# Patient Record
Sex: MALE | Race: WHITE | Employment: FULL TIME | URBAN - NONMETROPOLITAN AREA
[De-identification: names, ages, dates, MRNs, and addresses within clinical notes are randomized per-mention and may not be internally consistent; named-entity substitution may affect disease eponyms.]

---

## 2023-01-24 ENCOUNTER — HOSPITAL ENCOUNTER (EMERGENCY)
Age: 63
Discharge: HOME | End: 2023-01-24
Attending: EMERGENCY MEDICINE
Payer: COMMERCIAL

## 2023-01-24 VITALS
DIASTOLIC BLOOD PRESSURE: 78 MMHG | HEART RATE: 98 BPM | OXYGEN SATURATION: 94 % | BODY MASS INDEX: 30.48 KG/M2 | HEIGHT: 73 IN | TEMPERATURE: 98.2 F | SYSTOLIC BLOOD PRESSURE: 136 MMHG | WEIGHT: 230 LBS | RESPIRATION RATE: 18 BRPM

## 2023-01-24 DIAGNOSIS — T83.010A SUPRAPUBIC CATHETER DYSFUNCTION, INITIAL ENCOUNTER (HCC): Primary | ICD-10-CM

## 2023-01-24 PROCEDURE — 99283 EMERGENCY DEPT VISIT LOW MDM: CPT

## 2023-01-24 PROCEDURE — 51702 INSERT TEMP BLADDER CATH: CPT

## 2023-01-24 RX ORDER — AMLODIPINE BESYLATE 10 MG/1
10 TABLET ORAL DAILY
COMMUNITY

## 2023-01-24 RX ORDER — HYDROCHLOROTHIAZIDE 25 MG/1
25 TABLET ORAL DAILY
COMMUNITY

## 2023-01-24 ASSESSMENT — ENCOUNTER SYMPTOMS
ABDOMINAL PAIN: 0
RESPIRATORY NEGATIVE: 1

## 2023-01-24 NOTE — DISCHARGE INSTRUCTIONS
Continue your current catheter care.  Keep your follow-up with your clinician.  Watch for any developing symptoms such as increasing pain or fever.  Continue your antibiotics.

## 2023-01-24 NOTE — ED NOTES
Patient had 16F/10cc montiel that became dislodged this morning. Nurse attempted to place another 16F/10cc S/P catheter with difficulty.  [facility] notified and immediately at patients bedside, attempting to place 16F/10cc catheter with difficulty, verbal order received for nurse to place a 14F/10cc S/P catheter. 14F/10cc catheter placed without difficulty. Patient reports that he is currently on antibiotics for a UTI, nurse reviewed the importance of completing full course.       Odilon Lui RN  01/24/23 1205

## 2023-01-24 NOTE — ED PROVIDER NOTES
[facility] EMERGENCY DEPT  eMERGENCY dEPARTMENT eNCOUnter      Pt Name: Fidencio Bonilla  MRN: [id number]  [place] 1960  Date of evaluation: 1/24/2023  Provider: Surekha Oseguera MD    [address] [place] [address]       Chief Complaint   Patient presents with    Urinary Catheter     Patient has suprapubic catheter that became dislodged this morning         HISTORY OF PRESENT ILLNESS   (Location/Symptom, Timing/Onset,Context/Setting, Quality, Duration, Modifying Factors, Severity)  Note limiting factors. Fidencio Bonilla is a 63 y.o. male who presents to the emergency department on suprapubic catheter    82-year-old male with a suprapubic catheter. Presents with morning after the catheter came out around 6 6:30 AM.  He has had the suprapubic catheter for over 2 years. He had developed prostate cancer and complications. And required suprapubic catheter chronically. It is usually replaced every 3 weeks. By his doctor or nurse. It was replaced this past Saturday. He states he has a urinary tract infection is currently on antibiotics. Patient had some lower abdominal discomfort but then he had an expulsion of urine through the suprapubic stoma with some release of that discomfort. Now he denies any increasing abdominal pain denies any fever. He is a over the road . He is in our area this morning comes through frequently. The history is provided by the patient. NursingNotes were reviewed. REVIEW OF SYSTEMS    (2-9 systems for level 4, 10 or more for level 5)     Review of Systems   Constitutional:  Negative for chills and fever. HENT: Negative. Respiratory: Negative. Gastrointestinal:  Negative for abdominal pain. Genitourinary:         Suprapubic catheter   Skin: Negative. Psychiatric/Behavioral: Negative. All other systems reviewed and are negative. A complete review of systems was performed and is negative except as noted above in the HPI.        PAST MEDICAL HISTORY     Past Medical History: Diagnosis Date    Hypertension          SURGICAL HISTORY     No past surgical history on file. CURRENT MEDICATIONS       Previous Medications    AMLODIPINE (NORVASC) 10 MG TABLET    Take 10 mg by mouth daily    HYDROCHLOROTHIAZIDE (HYDRODIURIL) 25 MG TABLET    Take 25 mg by mouth daily       ALLERGIES     Procaine    FAMILY HISTORY     No family history on file. SOCIAL HISTORY       Social History     Socioeconomic History    Marital status:        SCREENINGS    Jud Coma Scale  Eye Opening: Spontaneous  Best Verbal Response: Oriented  Best Motor Response: Obeys commands  Anila Coma Scale Score: 15        PHYSICAL EXAM    (up to 7 for level 4, 8 or more for level 5)     ED Triage Vitals [01/24/23 0721]   BP Temp Temp src Heart Rate Resp SpO2 Height Weight   (!) 148/90 98 °F (36.7 °C) -- (!) 114 18 93 % 6' 1\" (1.854 m) 230 lb (104.3 kg)       Physical Exam  Vitals and nursing note reviewed. Constitutional:       Appearance: He is well-developed. HENT:      Head: Normocephalic and atraumatic. Right Ear: External ear normal.      Left Ear: External ear normal.      Mouth/Throat:      Mouth: Mucous membranes are moist.   Eyes:      General: No scleral icterus. Conjunctiva/sclera: Conjunctivae normal.      Pupils: Pupils are equal, round, and reactive to light. Cardiovascular:      Rate and Rhythm: Regular rhythm. Tachycardia present. Pulmonary:      Effort: Pulmonary effort is normal.   Abdominal:      General: Bowel sounds are normal.      Palpations: Abdomen is soft. Tenderness: There is no abdominal tenderness. Comments: Suprapubic stoma looks well matured. Musculoskeletal:         General: Normal range of motion. Cervical back: Normal range of motion and neck supple. Skin:     General: Skin is warm and dry. Coloration: Skin is not jaundiced or pale. Neurological:      General: No focal deficit present.       Mental Status: He is alert and oriented to person, place, and time. Psychiatric:         Mood and Affect: Mood normal.         Behavior: Behavior normal.       DIAGNOSTIC RESULTS     EKG: All EKG's are interpreted by the Emergency Department Physician who either signs or Co-signs this chart in the absence of a cardiologist.        RADIOLOGY:   Non-plain film images such as CT, Ultrasound and MRI are read by the radiologist. Plainradiographic images are visualized and preliminarily interpreted by the emergency physician with the below findings:        Interpretation per the Radiologist below, if available at the time of this note:    No orders to display         ED BEDSIDE ULTRASOUND:   Performed by ED Physician - none    LABS:  Labs Reviewed - No data to display    All other labs were within normal range or not returned as of this dictation. EMERGENCY DEPARTMENT COURSE and DIFFERENTIALDIAGNOSIS/MDM:   Vitals:    Vitals:    01/24/23 0721   BP: (!) 148/90   Pulse: (!) 114   Resp: 18   Temp: 98 °F (36.7 °C)   SpO2: 93%   Weight: 230 lb (104.3 kg)   Height: 6' 1\" (1.854 m)       MDM  Number of Diagnoses or Management Options  Suprapubic catheter dysfunction, initial encounter [facility])  Diagnosis management comments: Staff and I attempted to pass a 12 Lao catheter through the suprapubic stoma. Met with resistance in hip of catheter had some blood. We then took a 15 Lao catheter which passed successfully on the first attempt. With return of urine. Since patient already on antibiotics are not going to repeat a urinalysis. Wish we could have put in a larger catheter. Discussed with the patient when he follows up with his doctor on 7 February they may need to dilate him and put in a larger catheter. But at least this catheter will function. He irrigates his catheter every couple days with a saline vinegar solution. He is advised to watch out for any new or worsening symptoms.   Mainly abdominal pain or fever.          CONSULTS:  None    PROCEDURES:  Unless otherwise notedbelow, none     Procedures    FINAL IMPRESSION     1.  Suprapubic catheter dysfunction, initial encounter [facility])          DISPOSITION/PLAN   DISPOSITION Decision To Discharge 01/24/2023 07:51:07 AM      PATIENT REFERRED TO:  @FUP@    DISCHARGE MEDICATIONS:  New Prescriptions    No medications on file          (Please note that portions of this note were completed with a voice recognition program.  Efforts were made to edit the dictations butoccasionally words are mis-transcribed.)    Major Opitz, MD (electronically signed)  AttendingEmergency Physician          Robbi Wilson MD  01/24/23 2678